# Patient Record
Sex: FEMALE | Race: WHITE | NOT HISPANIC OR LATINO | Employment: UNEMPLOYED | URBAN - METROPOLITAN AREA
[De-identification: names, ages, dates, MRNs, and addresses within clinical notes are randomized per-mention and may not be internally consistent; named-entity substitution may affect disease eponyms.]

---

## 2017-05-19 ENCOUNTER — TELEPHONE (OUTPATIENT)
Dept: FAMILY MEDICINE | Facility: CLINIC | Age: 57
End: 2017-05-19

## 2017-05-19 NOTE — TELEPHONE ENCOUNTER
Panel Management Review      Patient has the following on her problem list: None      Composite cancer screening  Chart review shows that this patient is due/due soon for the following Pap Smear, Mammogram and Colonoscopy  Summary:    Patient is due/failing the following:   COLONOSCOPY, MAMMOGRAM, PAP and PHYSICAL    Action needed:   Patient needs office visit for Physical/Pap/Mammogram/Colonoscopy.    Type of outreach:    Sent letter.    Questions for provider review:    None                                                                                                                                    Noa Estes LPN     Chart routed to Care Team .

## 2017-05-19 NOTE — LETTER
[facility]  [address]  [facility] [id number] [phone]                                                                                                           Nicole Clancy  [address]  [facility] [address]    May 19, 2017          Dear Nicole Clancy,      We care about your well-being!  In order to ensure we are providing the best quality care, we have reviewed your chart and see that you are due for:     ___x__ Physical   ___x__ Pap Smear   ___x__ Mammogram   _____ Diabetes Followup   _____ Hypertension Followup   _____ Cholesterol Followup (Provider Appointment)   _____ Cholesterol Test (Lab-Only Appointment)   ___x__ Other:  Colonoscopy     We can assist you in scheduling these appointments at [phone].    If you have had (or plan to have) any of these tests done at a facility other than [facility] or a [facility], please have the results from these tests sent to your primary physician at [facility].             Sincerely,    Nicole Siegler, PA

## 2017-10-18 ENCOUNTER — TELEPHONE (OUTPATIENT)
Dept: FAMILY MEDICINE | Facility: CLINIC | Age: 57
End: 2017-10-18

## 2017-10-18 NOTE — TELEPHONE ENCOUNTER
Panel Management Review      Patient has the following on her problem list: None      Composite cancer screening  Chart review shows that this patient is due/due soon for the following Pap Smear, Mammogram and Colonoscopy  Summary:    Patient is due/failing the following:   COLONOSCOPY, MAMMOGRAM, PAP and PHQ9    Action needed:   Patient needs office visit for Physical.    Type of outreach:    Sent letter.    Questions for provider review:    None                                                                                                                                    Noa Estes LPN     Chart routed to Care Team .

## 2017-10-18 NOTE — LETTER
October 18, 2017    Nicole Clancy  [address] [facility] [address]    Dear Jordon Rivera cares about your health and your health plan.  I have reviewed your medical conditions, medication list and lab results, and am making recommendations based on this review to better manage your health.    You are in particular need of attention regarding:  -Depression/Anxiety  -Breast Cancer Screening  -Colon Cancer Screening  -Cervical Cancer Screening  -Wellness (Physical) Visit     I am recommending that you:     -schedule a WELLNESS (Physical) APPOINTMENT with me.   I will check fasting labs the same day - nothing to eat except water and meds for 8-10 hours prior.    -schedule a MAMMOGRAM which is due.    1 in 8 women will develop invasive breast cancer during her lifetime and it is the most common non-skin cancer in American women.  EARLY detection, new treatments, and a better understanding of the disease have increased survival rates - the 5 year survival rate in the 1960s was 63% and today it is close to 90%.    If you are under/uninsured, we recommend you contact [facility]. They offer mammograms at no charge or on a sliding fee charge. You can schedule with them at [phone]. Please have them send us the results.      Please disregard this reminder if you have had this exam elsewhere within the last year.  It would be helpful for us to have a copy of your mammogram report in your file so that we can best coordinate your care - please contact us with when your test was done so we can update your record.             -schedule a COLONOSCOPY to look for colon cancer (due every 10 years or 5 years in higher risk situations.)        Colon cancer is now the second leading cause of cancer-related deaths in the [place] for both men and women and there are over 130,000 new cases and 50,000 deaths per year from colon cancer.  Colonoscopies can prevent 90-95% of these deaths.  Problem  lesions can be removed before they ever become cancer.  This test is not only looking for cancer, but also getting rid of precancerious lesions.    If you are under/uninsured, we recommend you contact the [employer] program. [employer] is a free colorectal cancer screening program that provides colonoscopies for eligible under/uninsured [place] men and women. If you are interested in receiving a free colonoscopy, please call [employer] at [phone] (mention code [employer]) to see if you re eligible.      If you do not wish to do a colonoscopy or cannot afford to do one, at this time, there is another option. It is called a FIT test or Fecal Immunochemical Occult Blood Test (take home stool sample kit).  It does not replace the colonoscopy for colorectal cancer screening, but it can detect hidden bleeding in the lower colon.  It does need to be repeated every year and if a positive result is obtained, you would be referred for a colonoscopy.          If you have completed either one of these tests at another facility, please call with the details of when and where the tests were done and if they were normal or not. Or have the records sent to our clinic so that we can best coordinate your care.    -schedule a PAP SMEAR EXAM which is due.  Please disregard this reminder if you have had this exam elsewhere within the last year.  It would be helpful for us to have a copy of your recent pap smear report in our file so that we can best coordinate your care.    If you are under/uninsured, we recommend you contact the [facility]. They offer pap smears at no charge or on a sliding fee charge. You can schedule with them at [phone]. Please have them send us the results.    Fill out PHQ-9 and send back in the envelope provided.      Please call us at the [facility] location:  [phone] or use [employer] to address the above recommendations.     Thank you for trusting [facility].  We appreciate the  opportunity to serve you and look forward to supporting your healthcare in the future.    If you have (or plan to have) any of these tests done at a facility other than a [facility] or a [facility], please have the results sent to the [facility] location noted above.      Best Regards,    Nicole Siegler, PA

## 2017-11-29 ENCOUNTER — OFFICE VISIT (OUTPATIENT)
Dept: ORTHOPEDICS | Facility: CLINIC | Age: 57
End: 2017-11-29

## 2017-11-29 VITALS
SYSTOLIC BLOOD PRESSURE: 132 MMHG | BODY MASS INDEX: 29.99 KG/M2 | DIASTOLIC BLOOD PRESSURE: 87 MMHG | HEART RATE: 87 BPM | WEIGHT: 180 LBS | HEIGHT: 65 IN

## 2017-11-29 DIAGNOSIS — S76.011A SPRAIN, GLUTEUS MEDIUS, RIGHT, INITIAL ENCOUNTER: Primary | ICD-10-CM

## 2017-11-29 DIAGNOSIS — M25.551 HIP PAIN, RIGHT: ICD-10-CM

## 2017-11-29 RX ORDER — DICLOFENAC SODIUM 75 MG/1
75 TABLET, DELAYED RELEASE ORAL 2 TIMES DAILY
Qty: 30 TABLET | Refills: 0 | Status: SHIPPED | OUTPATIENT
Start: 2017-11-29 | End: 2017-12-14

## 2017-11-29 NOTE — PROGRESS NOTES
CHIEF COMPLAINT:  Consult (Right hip pain)       HISTORY OF PRESENT ILLNESS  Ms. Kirill Clancy is a pleasant 57 year old year old female who presents to clinic today with right hip pain.  Pain began after salsa dancing with her boyfriend.  Nicole explains that she has had an Xray about two months ago at an urgent care.  Told she had a normal hip XR.  Tried ibuprofen, muscle relaxant with no improvement.    Of possible significance, patient has had 4 back surgeries since 2011.  Severe spinal stenosis.  L4-L5 fusion, L4-S1, L2-L3 fusion.      Onset: sudden, noticed after a night of salsa dancing  Location: right hip, greater troch  Quality:  sharp  Duration: 2 months  Severity: 10/10 at worst  Timing:intermittent episodes worse with walking, laying on right side  Modifying factors:  resting and non-use makes it better, movement and use makes it worse  Associated signs & symptoms: pain  Previous similar pain: No  Treatments to date:  Heating pad, Bengay, Tylenol    Additional history: as documented    MEDICAL HISTORY  There is no problem list on file for this patient.  Lumbar spinal stenosis    Current Outpatient Prescriptions   Medication Sig Dispense Refill     oxyCODONE (ROXICODONE) 10 MG IR tablet Take 1 tablet (10 mg) by mouth 3 times daily as needed for moderate to severe pain (no more than 3 pills per day) 24 tablet 0     Cholecalciferol (VITAMIN D3 PO) Take by mouth daily       calcium carbonate (OS-DANIEL 500 MG [place]. [place]) 500 MG tablet Take 500 mg by mouth 2 times daily       Escitalopram Oxalate (LEXAPRO PO) Take  by mouth.       ALPRAZolam (XANAX PO) Take  by mouth.         Allergies   Allergen Reactions     Animal Dander        Family History   Problem Relation Age of Onset     Lung Cancer Mother        Additional medical/Social/Surgical histories reviewed in [facility] and updated as appropriate.     REVIEW OF SYSTEMS (11/29/2017)  CONSTITUTIONAL: Denies fever and weight loss  EYES: Denies acute vision  "changes  ENT: Denies hearing changes or difficulty swallowing  CARDIAC: Denies chest pain or edema  RESPIRATORY: Denies dyspnea, cough or wheeze  GASTROINTESTINAL: Denies abdominal pain, nausea, vomiting  MUSCULOSKELETAL: See HPI  SKIN: Denies any recent rash or lesion  NEUROLOGICAL: Denies numbness or focal weakness  PSYCHIATRIC: No history of psychiatric symptoms or problems  ENDOCRINE: Denies current diagnosis of diabetes   HEMATOLOGY: Denies episodes of easy bleeding      PHYSICAL EXAM  /87  Pulse 87  Ht 1.638 m (5' 4.5\")  Wt 81.6 kg (180 lb)  BMI 30.42 kg/m2    General Appearance: Well appearing, alert, in no acute distress, well-hydrated, and well nourished  Cardiovascular: no signs of upper or lower extremity edema  Respiratory: no respiratory distress, no audible wheezing, no labored breathing, symmetric thoracic excursion  Psychiatric: mood and affect are appropriate, patient is oriented to time, place and person  Musculoskeletal -RT hip  - stance: normal gait without limp, no obvious leg length discrepancy, normal heel and toe walk, single leg squat displays knee valgus, contralateral hip drop, internal rotation of the hip   - inspection: no swelling or effusion,  normal bone and joint alignment, no obvious deformity  - palpation: tender over the greater trochanter, near entirety of gluteus medius muscle  - ROM: pain with active abduction, normal and painless flexion, extension, IR, ER, adduction  - strength: 5/5 in all planes  - special tests:  (-) TANG  (-) FADIR  no pain with axial femoral load  +mild trendelenberg on right.  (-)SLR  Full lumbar ROM without exacerbation of pain.  Neuro  - no sensory or motor deficit, grossly normal coordination, normal muscle tone  Skin  - no ecchymosis, erythema, warmth, or induration, no obvious rash  Psych  - interactive, appropriate, normal mood and affect     ASSESSMENT & PLAN  Ms. Kirill Clancy is a 57 year old year old female who presents to clinic " today with pain of her right lateral hip over gluteus medius/minimus distribution. At this point with minimal intervention the injury should be treated as a gluteal tendinopathy/strain with obvious core weakness.  Cannot fully r/o small gluteus tear, however.    Diagnosis: Gluteus medius strain, tendinopathy    -Start physical therapy, core program  -Voltaren BID x 7d  -Ice to hip as needed  -Follow up 6 weeks; consider U/S hip vs. MRI r/o tear.    It was a pleasure seeing Nicole today.    Barrett Abdalla DO, CAQSM  Primary Care Sports Medicine

## 2017-11-29 NOTE — LETTER
11/29/2017       RE: Nicole Clancy  [address]  [place] [facility] [address]     Dear Colleague,    Thank you for referring your patient, Nicole Clancy, to the [facility] at [facility]. Please see a copy of my visit note below.    CHIEF COMPLAINT:  Consult (Right hip pain)       HISTORY OF PRESENT ILLNESS  Ms. Kirill Clancy is a pleasant 57 year old year old female who presents to clinic today with right hip pain.  Pain began after salsa dancing with her boyfriend.  Nicole explains that she has had an Xray about two months ago at an urgent care.  Told she had a normal hip XR.  Tried ibuprofen, muscle relaxant with no improvement.    Of possible significance, patient has had 4 back surgeries since 2011.  Severe spinal stenosis.  L4-L5 fusion, L4-S1, L2-L3 fusion.      Onset: sudden, noticed after a night of salsa dancing  Location: right hip, greater troch  Quality:  sharp  Duration: 2 months  Severity: 10/10 at worst  Timing:intermittent episodes worse with walking, laying on right side  Modifying factors:  resting and non-use makes it better, movement and use makes it worse  Associated signs & symptoms: pain  Previous similar pain: No  Treatments to date:  Heating pad, Bengay, Tylenol    Additional history: as documented    MEDICAL HISTORY  There is no problem list on file for this patient.  Lumbar spinal stenosis    Current Outpatient Prescriptions   Medication Sig Dispense Refill     oxyCODONE (ROXICODONE) 10 MG IR tablet Take 1 tablet (10 mg) by mouth 3 times daily as needed for moderate to severe pain (no more than 3 pills per day) 24 tablet 0     Cholecalciferol (VITAMIN D3 PO) Take by mouth daily       calcium carbonate (OS-DANIEL 500 MG [place]. [place]) 500 MG tablet Take 500 mg by mouth 2 times daily       Escitalopram Oxalate (LEXAPRO PO) Take  by mouth.       ALPRAZolam (XANAX PO) Take  by mouth.         Allergies   Allergen Reactions      "Animal Dander        Family History   Problem Relation Age of Onset     Lung Cancer Mother        Additional medical/Social/Surgical histories reviewed in [facility] and updated as appropriate.     REVIEW OF SYSTEMS (11/29/2017)  CONSTITUTIONAL: Denies fever and weight loss  EYES: Denies acute vision changes  ENT: Denies hearing changes or difficulty swallowing  CARDIAC: Denies chest pain or edema  RESPIRATORY: Denies dyspnea, cough or wheeze  GASTROINTESTINAL: Denies abdominal pain, nausea, vomiting  MUSCULOSKELETAL: See HPI  SKIN: Denies any recent rash or lesion  NEUROLOGICAL: Denies numbness or focal weakness  PSYCHIATRIC: No history of psychiatric symptoms or problems  ENDOCRINE: Denies current diagnosis of diabetes   HEMATOLOGY: Denies episodes of easy bleeding      PHYSICAL EXAM  /87  Pulse 87  Ht 1.638 m (5' 4.5\")  Wt 81.6 kg (180 lb)  BMI 30.42 kg/m2    General Appearance: Well appearing, alert, in no acute distress, well-hydrated, and well nourished  Cardiovascular: no signs of upper or lower extremity edema  Respiratory: no respiratory distress, no audible wheezing, no labored breathing, symmetric thoracic excursion  Psychiatric: mood and affect are appropriate, patient is oriented to time, place and person  Musculoskeletal -RT hip  - stance: normal gait without limp, no obvious leg length discrepancy, normal heel and toe walk, single leg squat displays knee valgus, contralateral hip drop, internal rotation of the hip   - inspection: no swelling or effusion,  normal bone and joint alignment, no obvious deformity  - palpation: tender over the greater trochanter, near entirety of gluteus medius muscle  - ROM: pain with active abduction, normal and painless flexion, extension, IR, ER, adduction  - strength: 5/5 in all planes  - special tests:  (-) TANG  (-) FADIR  no pain with axial femoral load  +mild trendelenberg on right.  (-)SLR  Full lumbar ROM without exacerbation of pain.  Neuro  - no sensory or " motor deficit, grossly normal coordination, normal muscle tone  Skin  - no ecchymosis, erythema, warmth, or induration, no obvious rash  Psych  - interactive, appropriate, normal mood and affect     ASSESSMENT & PLAN  Ms. Kirill Clancy is a 57 year old year old female who presents to clinic today with pain of her right lateral hip over gluteus medius/minimus distribution. At this point with minimal intervention the injury should be treated as a gluteal tendinopathy/strain with obvious core weakness.  Cannot fully r/o small gluteus tear, however.    Diagnosis: Gluteus medius strain, tendinopathy    -Start physical therapy, core program  -Voltaren BID x 7d  -Ice to hip as needed  -Follow up 6 weeks; consider U/S hip vs. MRI r/o tear.    It was a pleasure seeing Nicole today.    Barrett bAdalla DO, CAM  Primary Care Sports Medicine

## 2017-11-29 NOTE — MR AVS SNAPSHOT
"              After Visit Summary   2017    Nicole Clancy    MRN: [id number]           Patient Information     Date Of Birth          1960        Visit Information        Provider Department      2017 9:20 AM Barrett Abdalla DO [facility]        Today's Diagnoses     Sprain, gluteus medius, right, initial encounter    -  1    Hip pain, right           Follow-ups after your visit        Who to contact     Please call your clinic at [phone] to:    Ask questions about your health    Make or cancel appointments    Discuss your medicines    Learn about your test results    Speak to your doctor   If you have compliments or concerns about an experience at your clinic, or if you wish to file a complaint, please contact [facility] Patient Relations at [phone] or email us at [email or web address]         Additional Information About Your Visit        MyChart Information     [facility] is an electronic gateway that provides easy, online access to your medical records. With [facility], you can request a clinic appointment, read your test results, renew a prescription or communicate with your care team.     To sign up for [facility] visit the website at [email or web address]   You will be asked to enter the access code listed below, as well as some personal information. Please follow the directions to create your username and password.     Your access code is: [email or web address]  Expires: 2018  6:31 AM     Your access code will  in 90 days. If you need help or a new code, please contact your [facility] or call [phone] for assistance.        Care EveryWhere ID     This is your Care EveryWhere ID. This could be used by other organizations to access your [place] medical records  [id number]        Your Vitals Were     Pulse Height BMI (Body Mass Index)             87 1.638 m (5' 4.5\") 30.42 kg/m2          " Blood Pressure from Last 3 Encounters:   11/29/17 132/87   12/09/16 102/70   12/08/16 120/80    Weight from Last 3 Encounters:   11/29/17 81.6 kg (180 lb)   12/09/16 87.3 kg (192 lb 8 oz)   12/08/16 85.7 kg (189 lb)              Today, you had the following     No orders found for display         Today's Medication Changes          These changes are accurate as of: 11/29/17  5:30 PM.  If you have any questions, ask your nurse or doctor.               Start taking these medicines.        Dose/Directions    diclofenac 75 MG EC tablet   Commonly known as:  VOLTAREN   Used for:  Sprain, gluteus medius, right, initial encounter        Dose:  75 mg   Take 1 tablet (75 mg) by mouth 2 times daily for 30 doses   Quantity:  30 tablet   Refills:  0         Stop taking these medicines if you haven't already. Please contact your care team if you have questions.     calcium carbonate 1250 MG tablet   Commonly known as:  OS-DANIEL 500 mg [place]. [place]           LEXAPRO PO           oxyCODONE IR 10 MG tablet   Commonly known as:  ROXICODONE           VITAMIN D3 PO                Where to get your medicines      These medications were sent to [facility] [id number] [place] [address] AT SEC OF [address] [address]     Phone:  [phone]     diclofenac 75 MG EC tablet                Primary Care Provider Fax #    Physician No Ref-Primary [phone]       No address on file        Equal Access to Services     JULIETH DOHERTY AH: Theodore Klein, waaxda lunaomy, qaybta kaalmada yahir, alicia campos. So [facility] [phone].    ATENCIÓN: Si habla español, tiene a elizabeth disposición servicios gratuitos de asistencia lingüística. Llame al [phone].    We comply with applicable federal civil rights laws and [place] laws. We do not discriminate on the basis of race, color, national origin, age, disability, sex, sexual orientation, or gender  identity.            Thank you!     Thank you for choosing [facility]  for your care. Our goal is always to provide you with excellent care. Hearing back from our patients is one way we can continue to improve our services. Please take a few minutes to complete the written survey that you may receive in the mail after your visit with us. Thank you!             Your Updated Medication List - Protect others around you: Learn how to safely use, store and throw away your medicines at www.disposemymeds.org.          This list is accurate as of: 11/29/17  5:30 PM.  Always use your most recent med list.                   Brand Name Dispense Instructions for use Diagnosis    diclofenac 75 MG EC tablet    VOLTAREN    30 tablet    Take 1 tablet (75 mg) by mouth 2 times daily for 30 doses    Sprain, gluteus medius, right, initial encounter       XANAX PO      Take  by mouth.

## 2017-12-04 DIAGNOSIS — S76.011A STRAIN OF GLUTEUS MEDIUS OF RIGHT LOWER EXTREMITY, INITIAL ENCOUNTER: Primary | ICD-10-CM

## 2018-02-16 ENCOUNTER — TELEPHONE (OUTPATIENT)
Dept: FAMILY MEDICINE | Facility: CLINIC | Age: 58
End: 2018-02-16

## 2018-02-16 NOTE — TELEPHONE ENCOUNTER
Panel Management Review      Patient has the following on her problem list:   Depression / Dysthymia review    Measure:  Needs PHQ-9 score of 4 or less during index window.  Administer PHQ-9 and if score is 5 or more, send encounter to provider for next steps.    5 - 7 month window range:     No flowsheet data found.    If PHQ-9 recheck is 5 or more, route to provider for next steps.    Patient is due for:  PHQ9 and DAP      Composite cancer screening  Chart review shows that this patient is due/due soon for the following Pap Smear, Mammogram and Colonoscopy  Summary:    Patient is due/failing the following:   COLONOSCOPY, MAMMOGRAM, PAP, PHQ9 and PHYSICAL    Action needed:   Patient needs office visit for Physical/pap/mammo/PHQ-9.    Type of outreach:    Sent letter.    Questions for provider review:    None                                                                                                                                    Noa Estes LPN     Chart routed to Care Team .

## 2019-03-15 ENCOUNTER — OFFICE VISIT (OUTPATIENT)
Dept: ORTHOPEDICS | Facility: CLINIC | Age: 59
End: 2019-03-15
Payer: COMMERCIAL

## 2019-03-15 VITALS
SYSTOLIC BLOOD PRESSURE: 122 MMHG | HEART RATE: 106 BPM | DIASTOLIC BLOOD PRESSURE: 80 MMHG | WEIGHT: 170 LBS | HEIGHT: 65 IN | OXYGEN SATURATION: 96 % | BODY MASS INDEX: 28.32 KG/M2

## 2019-03-15 DIAGNOSIS — M79.645 CHRONIC PAIN OF BOTH THUMBS: ICD-10-CM

## 2019-03-15 DIAGNOSIS — M18.11 PRIMARY OSTEOARTHRITIS OF FIRST CARPOMETACARPAL JOINT OF RIGHT HAND: ICD-10-CM

## 2019-03-15 DIAGNOSIS — M79.644 CHRONIC PAIN OF BOTH THUMBS: ICD-10-CM

## 2019-03-15 DIAGNOSIS — M65.311 TRIGGER FINGER OF RIGHT THUMB: Primary | ICD-10-CM

## 2019-03-15 DIAGNOSIS — G89.29 CHRONIC PAIN OF BOTH THUMBS: ICD-10-CM

## 2019-03-15 PROCEDURE — 20550 NJX 1 TENDON SHEATH/LIGAMENT: CPT | Mod: RT | Performed by: FAMILY MEDICINE

## 2019-03-15 PROCEDURE — 99213 OFFICE O/P EST LOW 20 MIN: CPT | Mod: 25 | Performed by: FAMILY MEDICINE

## 2019-03-15 RX ORDER — TRAMADOL HYDROCHLORIDE 50 MG/1
50 TABLET ORAL
COMMUNITY
Start: 2019-02-27

## 2019-03-15 RX ORDER — ALPRAZOLAM 1 MG
TABLET ORAL
Refills: 1 | COMMUNITY
Start: 2019-03-13

## 2019-03-15 RX ORDER — TRIAMCINOLONE ACETONIDE 40 MG/ML
40 INJECTION, SUSPENSION INTRA-ARTICULAR; INTRAMUSCULAR ONCE
Status: DISPENSED | OUTPATIENT
Start: 2019-03-15

## 2019-03-15 RX ORDER — OXYCODONE AND ACETAMINOPHEN 5; 325 MG/1; MG/1
1-2 TABLET ORAL
COMMUNITY
Start: 2019-02-22

## 2019-03-15 RX ORDER — HYDROXYZINE HYDROCHLORIDE 25 MG/1
50 TABLET, FILM COATED ORAL
COMMUNITY
Start: 2019-03-12

## 2019-03-15 RX ORDER — IBUPROFEN 800 MG/1
TABLET ORAL
Refills: 0 | COMMUNITY
Start: 2018-05-02

## 2019-03-15 RX ORDER — TRIAMCINOLONE ACETONIDE 40 MG/ML
20 INJECTION, SUSPENSION INTRA-ARTICULAR; INTRAMUSCULAR ONCE
Status: COMPLETED | OUTPATIENT
Start: 2019-03-15 | End: 2019-03-15

## 2019-03-15 RX ORDER — ACETAMINOPHEN 500 MG
500-1000 TABLET ORAL
COMMUNITY
Start: 2017-10-30

## 2019-03-15 RX ORDER — DIPHENOXYLATE HYDROCHLORIDE AND ATROPINE SULFATE 2.5; .025 MG/1; MG/1
1 TABLET ORAL
COMMUNITY

## 2019-03-15 RX ORDER — TRAMADOL HYDROCHLORIDE 50 MG/1
50 TABLET ORAL EVERY 6 HOURS PRN
Qty: 12 TABLET | Refills: 0 | Status: SHIPPED | OUTPATIENT
Start: 2019-03-15 | End: 2019-03-18

## 2019-03-15 RX ADMIN — TRIAMCINOLONE ACETONIDE 20 MG: 40 INJECTION, SUSPENSION INTRA-ARTICULAR; INTRAMUSCULAR at 09:59

## 2019-03-15 ASSESSMENT — MIFFLIN-ST. JEOR: SCORE: 1356.99

## 2019-03-15 ASSESSMENT — PAIN SCALES - GENERAL: PAINLEVEL: WORST PAIN (10)

## 2019-03-15 NOTE — LETTER
3/15/2019         RE: Nicole Roy  [address]  [address]  [facility] [address]        Dear Colleague,    Thank you for referring your patient, Nicole Roy, to the [facility]. Please see a copy of my visit note below.    CHIEF COMPLAINT:  Consult (bilateral thumb pain. no injury )       HISTORY OF PRESENT ILLNESS  Ms. Roy is a pleasant 58 year old year old female who presents to clinic today with bilateral thumb pain, right>left.  Nicole explains that these symptoms began on the right over the last 6 months or so.  Pain is in the base of the thumbs and travels distally.  Her right thumb initially began with pain and eventually symptoms of triggering with flexion and extension.  Triggering began about 3 months ago or so.  She does have times when this thumb seems to be locked in flexion and she has to forcibly open.    She notes more recent intermittent triggering of the left thumb but this is not as bad.  This is been going on for about the last month or so.  It is not locked.    No known initial trauma or inciting event.  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  Patient attempted splinting by taping the thumbs for about 6-8 weeks which improved her symptoms slightly.  No other treatments to date.    Additional history: as documented    MEDICAL HISTORY  There is no problem list on file for this patient.      Current Outpatient Medications   Medication Sig Dispense Refill     ALPRAZolam (XANAX PO) Take  by mouth.       diclofenac (VOLTAREN) 75 MG EC tablet Take 1 tablet (75 mg) by mouth 2 times daily for 30 doses 30 tablet 0       Allergies   Allergen Reactions     Animal Dander      Suture      Hypericum Perforatum Rash     Trazodone Rash       Family History   Problem Relation Age of Onset     Lung Cancer Mother        Additional medical/Social/Surgical histories reviewed in [facility] and updated as appropriate.    "  REVIEW OF SYSTEMS (3/15/2019)  CONSTITUTIONAL: Denies fever and weight loss  EYES: Denies acute vision changes  ENT: Denies hearing changes or difficulty swallowing  CARDIAC: Denies chest pain or edema  RESPIRATORY: Denies dyspnea, cough or wheeze  GASTROINTESTINAL: Denies abdominal pain, nausea, vomiting  MUSCULOSKELETAL: See HPI  SKIN: Denies any recent rash or lesion  NEUROLOGICAL: Denies numbness or focal weakness  PSYCHIATRIC: No history of psychiatric symptoms or problems  ENDOCRINE: Diagnosis of diabetes: No  HEMATOLOGY: Denies episodes of easy bleeding      PHYSICAL EXAM  Ht 1.651 m (5' 5\")   Wt 77.1 kg (170 lb)   BMI 28.29 kg/m       General  - normal appearance, in no obvious distress  CV  - normal radial pulse  Pulm  - normal respiratory pattern, non-labored  Musculoskeletal - Thumb bilaterally  - inspection: no atrophy, normal joint alignment, no swelling  - palpation: Tender to palpation at the A1 region of bilateral thumbs and into the IP joint at the volar aspect, no tenderness at the anatomical snuffbox. Mild CMC TTP on right  - ROM right:  MCP 90 deg flexion   0 deg extension    deg flexion   0 deg extension   DIP 80 deg flexion   0 deg extension   palpable snap at the A1 pulley with active flexion, reproducible on right only.  - ROM left:  MCP 90 deg flexion   0 deg extension    deg flexion   0 deg extension   DIP 80 deg flexion   0 deg extension  - strength: 5/5  strength, 5/5 wrist abduction, 5/5 flexion, extension, pronation, supination, adduction  - special tests:  (-) varus  (-) valgus  Neuro  - no numbness, no motor deficit, grossly normal coordination, normal muscle tone  Skin  - no ecchymosis, erythema, warmth, or induration, no obvious rash  Psych  - interactive, appropriate, normal mood and affect    IMAGING : X-ray right thumb  Ramesh, Rad Results In - 10/29/2018  5:24 PM CDT  COMPARISON:  None.    FINDINGS:  Mild osteoarthritis in the first CMC joint. No acute bone or " "joint abnormalities.     ASSESSMENT & PLAN  Ms. Roy is a 58 year old year old female who presents to clinic today with subacute bilateral thumb pain, right greater than left.  Clinical examination today consistent with trigger thumb of her right upper extremity and perhaps early trigger thumb of the left upper extremity.    Diagnosis: Trigger thumb of right upper extremity, left thumb pain, CMC osteoarthritis of right thumb    -Corticosteroid injection to the right A1 pulley and tendon sheath today  -Bilateral thumb splinting at night and with periods of increased triggering  -Consider hand therapy in the future, declined at this time  -May consider left thumb injection if she has appreciable relief from the right  -Voltaren gel to bilateral thumbs near tendon regions  -Small prescription for tramadol for breakthrough pain  -Follow up 4 weeks     Trigger Thumb Injection - Ultrasound Guided  The patient was informed of the risks and the benefits of the procedure and a written consent was signed.  The patient s right thumb was prepped with chlorhexidine in sterile fashion.   20 mg of triamcinolone suspension was drawn up into a 3 mL syringe with 1.5 mL of 1% lidocaine  Injection was performed using sterile technique.  Under ultrasound guidance a 1.5\" 25-gauge needle was used to enter the tendon sheath at the A1 pulley.  Needle placement was visualized and documented with ultrasound.  Ultrasound was necessary to ensure that steroid did not enter the tendon itself which could potentially cause tendon rupture.  Injection performed long axis to the probe.  Injection solution visualized within the joint space.  Images were permanently stored for the patient's record.  There were no complications. The patient tolerated the procedure well. There was negligible bleeding.   The patient was instructed to ice the hand upon leaving clinic and refrain from overuse over the next 3 days.   The patient was instructed to call or " go to the emergency room with any unusual pain, swelling, redness, or if otherwise concerned.  A follow up appointment will be scheduled to evaluate response to the injection, and to assess range of motion and pain  It was a pleasure seeing Nicole today.    Barrett Abdalla DO, [facility]  Primary Care Sports Medicine      Prior to injection, verified patient identity using patient's name and date of birth.  Due to injection administration, patient instructed to remain in clinic for 15 minutes  afterwards, and to report any adverse reaction to me immediately.    Joint injection was performed.      Drug Amount Wasted:  Yes: 20 mg/ml   Vial/Syringe: Single dose vial  Expiration Date:      Again, thank you for allowing me to participate in the care of your patient.        Sincerely,        Barrett Abdalla DO

## 2019-03-15 NOTE — PROGRESS NOTES
Prior to injection, verified patient identity using patient's name and date of birth.  Due to injection administration, patient instructed to remain in clinic for 15 minutes  afterwards, and to report any adverse reaction to me immediately.    Joint injection was performed.      Drug Amount Wasted:  Yes: 20 mg/ml   Vial/Syringe: Single dose vial  Expiration Date:

## 2019-03-15 NOTE — PROGRESS NOTES
CHIEF COMPLAINT:  Consult (bilateral thumb pain. no injury )       HISTORY OF PRESENT ILLNESS  Ms. Roy is a pleasant 58 year old year old female who presents to clinic today with bilateral thumb pain, right>left.  Nicole explains that these symptoms began on the right over the last 6 months or so.  Pain is in the base of the thumbs and travels distally.  Her right thumb initially began with pain and eventually symptoms of triggering with flexion and extension.  Triggering began about 3 months ago or so.  She does have times when this thumb seems to be locked in flexion and she has to forcibly open.    She notes more recent intermittent triggering of the left thumb but this is not as bad.  This is been going on for about the last month or so.  It is not locked.    No known initial trauma or inciting event.  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  Patient attempted splinting by taping the thumbs for about 6-8 weeks which improved her symptoms slightly.  No other treatments to date.    Additional history: as documented    MEDICAL HISTORY  There is no problem list on file for this patient.      Current Outpatient Medications   Medication Sig Dispense Refill     ALPRAZolam (XANAX PO) Take  by mouth.       diclofenac (VOLTAREN) 75 MG EC tablet Take 1 tablet (75 mg) by mouth 2 times daily for 30 doses 30 tablet 0       Allergies   Allergen Reactions     Animal Dander      Suture      Hypericum Perforatum Rash     Trazodone Rash       Family History   Problem Relation Age of Onset     Lung Cancer Mother        Additional medical/Social/Surgical histories reviewed in [facility] and updated as appropriate.     REVIEW OF SYSTEMS (3/15/2019)  CONSTITUTIONAL: Denies fever and weight loss  EYES: Denies acute vision changes  ENT: Denies hearing changes or difficulty swallowing  CARDIAC: Denies chest pain or edema  RESPIRATORY: Denies dyspnea, cough or  "wheeze  GASTROINTESTINAL: Denies abdominal pain, nausea, vomiting  MUSCULOSKELETAL: See HPI  SKIN: Denies any recent rash or lesion  NEUROLOGICAL: Denies numbness or focal weakness  PSYCHIATRIC: No history of psychiatric symptoms or problems  ENDOCRINE: Diagnosis of diabetes: No  HEMATOLOGY: Denies episodes of easy bleeding      PHYSICAL EXAM  Ht 1.651 m (5' 5\")   Wt 77.1 kg (170 lb)   BMI 28.29 kg/m      General  - normal appearance, in no obvious distress  CV  - normal radial pulse  Pulm  - normal respiratory pattern, non-labored  Musculoskeletal - Thumb bilaterally  - inspection: no atrophy, normal joint alignment, no swelling  - palpation: Tender to palpation at the A1 region of bilateral thumbs and into the IP joint at the volar aspect, no tenderness at the anatomical snuffbox. Mild CMC TTP on right  - ROM right:  MCP 90 deg flexion   0 deg extension    deg flexion   0 deg extension   DIP 80 deg flexion   0 deg extension   palpable snap at the A1 pulley with active flexion, reproducible on right only.  - ROM left:  MCP 90 deg flexion   0 deg extension    deg flexion   0 deg extension   DIP 80 deg flexion   0 deg extension  - strength: 5/5  strength, 5/5 wrist abduction, 5/5 flexion, extension, pronation, supination, adduction  - special tests:  (-) varus  (-) valgus  Neuro  - no numbness, no motor deficit, grossly normal coordination, normal muscle tone  Skin  - no ecchymosis, erythema, warmth, or induration, no obvious rash  Psych  - interactive, appropriate, normal mood and affect    IMAGING : X-ray right thumb  Ramesh, Rad Results In - 10/29/2018  5:24 PM CDT  COMPARISON:  None.    FINDINGS:  Mild osteoarthritis in the first CMC joint. No acute bone or joint abnormalities.     ASSESSMENT & PLAN  Ms. Roy is a 58 year old year old female who presents to clinic today with subacute bilateral thumb pain, right greater than left.  Clinical examination today consistent with trigger thumb of " "her right upper extremity and perhaps early trigger thumb of the left upper extremity.    Diagnosis: Trigger thumb of right upper extremity, left thumb pain, CMC osteoarthritis of right thumb    -Corticosteroid injection to the right A1 pulley and tendon sheath today  -Bilateral thumb splinting at night and with periods of increased triggering  -Consider hand therapy in the future, declined at this time  -May consider left thumb injection if she has appreciable relief from the right  -Voltaren gel to bilateral thumbs near tendon regions  -Small prescription for tramadol for breakthrough pain  -Follow up 4 weeks     Trigger Thumb Injection - Ultrasound Guided  The patient was informed of the risks and the benefits of the procedure and a written consent was signed.  The patient s right thumb was prepped with chlorhexidine in sterile fashion.   20 mg of triamcinolone suspension was drawn up into a 3 mL syringe with 1.5 mL of 1% lidocaine  Injection was performed using sterile technique.  Under ultrasound guidance a 1.5\" 25-gauge needle was used to enter the tendon sheath at the A1 pulley.  Needle placement was visualized and documented with ultrasound.  Ultrasound was necessary to ensure that steroid did not enter the tendon itself which could potentially cause tendon rupture.  Injection performed long axis to the probe.  Injection solution visualized within the joint space.  Images were permanently stored for the patient's record.  There were no complications. The patient tolerated the procedure well. There was negligible bleeding.   The patient was instructed to ice the hand upon leaving clinic and refrain from overuse over the next 3 days.   The patient was instructed to call or go to the emergency room with any unusual pain, swelling, redness, or if otherwise concerned.  A follow up appointment will be scheduled to evaluate response to the injection, and to assess range of motion and pain  It was a pleasure seeing " Nicole today.    Barrett Abdalla DO, CAQSM  Primary Care Sports Medicine

## 2019-03-15 NOTE — NURSING NOTE
"Nicole Roy's chief complaint for this visit includes:  Chief Complaint   Patient presents with     Consult     bilateral thumb pain. no injury      PCP: No Ref-Primary, Physician    Referring Provider:  Referred Self, MD  No address on file    /80 (BP Location: Left arm, Patient Position: Sitting, Cuff Size: Adult Regular)   Pulse 106   Ht 1.651 m (5' 5\")   Wt 77.1 kg (170 lb)   SpO2 96%   BMI 28.29 kg/m    Worst Pain (10)     Do you need any medication refills at today's visit? No    Prior to injection, verified patient identity using patient's name and date of birth.  Due to injection administration, patient instructed to remain in clinic for 15 minutes  afterwards, and to report any adverse reaction to me immediately.    Joint injection was performed.      Drug Amount Wasted:  None.  Vial/Syringe: Single dose vial  Expiration Date:  09/2020    "

## 2019-03-15 NOTE — PATIENT INSTRUCTIONS
Thanks for coming today.  Ortho/Sports Medicine Clinic  [address] [place] [address]    To schedule future appointments in Ortho Clinic, you may call [phone].    To schedule ordered imaging by your provider:   Call Central [facility] [phone]    To schedule an injection ordered by your provider:  Call [facility] Injection scheduling line: [phone]  [facility] available online at:  [email or web address]    Please call if any further questions or concerns ([phone]).  Clinic hours 8 am to 5 pm.    Return to clinic (call) if symptoms worsen or fail to improve.

## 2021-06-02 ENCOUNTER — RECORDS - HEALTHEAST (OUTPATIENT)
Dept: ADMINISTRATIVE | Facility: CLINIC | Age: 61
End: 2021-06-02

## 2021-06-03 ENCOUNTER — RECORDS - HEALTHEAST (OUTPATIENT)
Dept: ADMINISTRATIVE | Facility: CLINIC | Age: 61
End: 2021-06-03

## 2022-08-28 NOTE — LETTER
February 16, 2018    Nicole Clancy  [address]  [facility] [address]    Dear Jordon Rivera cares about your health and your health plan.  I have reviewed your medical conditions, medication list and lab results, and am making recommendations based on this review to better manage your health.    You are in particular need of attention regarding:  -Depression/Anxiety  -Breast Cancer Screening  -Colon Cancer Screening  -Cervical Cancer Screening  -Wellness (Physical) Visit     I am recommending that you:     -schedule a WELLNESS (Physical) APPOINTMENT with me.   I will check fasting labs the same day - nothing to eat except water and meds for 8-10 hours prior. (If you go elsewhere for Wellness visits then please disregard this reminder.)    -schedule a MAMMOGRAM which is due.    1 in 8 women will develop invasive breast cancer during her lifetime and it is the most common non-skin cancer in [place] women.  EARLY detection, new treatments, and a better understanding of the disease have increased survival rates - the 5 year survival rate in the 1960s was 63% and today it is close to 90%.    If you are under/uninsured, we recommend you contact the [facility]. They offer mammograms at no charge or on a sliding fee charge. You can schedule with them at [phone]. Please have them send us the results.      Please disregard this reminder if you have had this exam elsewhere within the last year.  It would be helpful for us to have a copy of your mammogram report in your file so that we can best coordinate your care - please contact us with when your test was done so we can update your record.             -schedule a COLONOSCOPY to look for colon cancer (due every 10 years or 5 years in higher risk situations.)        Colon cancer is now the second leading cause of cancer-related deaths in the [place] for both men and women and there are over 130,000 new cases and 50,000 deaths per year  from colon cancer.  Colonoscopies can prevent 90-95% of these deaths.  Problem lesions can be removed before they ever become cancer.  This test is not only looking for cancer, but also getting rid of precancerious lesions.    If you are under/uninsured, we recommend you contact the [employer] program. [employer] is a free colorectal cancer screening program that provides colonoscopies for eligible under/uninsured [place] men and women. If you are interested in receiving a free colonoscopy, please call [employer] at [phone] (mention code [employer]) to see if you re eligible.      If you do not wish to do a colonoscopy or cannot afford to do one, at this time, there is another option. It is called a FIT test or Fecal Immunochemical Occult Blood Test (take home stool sample kit).  It does not replace the colonoscopy for colorectal cancer screening, but it can detect hidden bleeding in the lower colon.  It does need to be repeated every year and if a positive result is obtained, you would be referred for a colonoscopy.          If you have completed either one of these tests at another facility, please call with the details of when and where the tests were done and if they were normal or not. Or have the records sent to our clinic so that we can best coordinate your care.    -schedule a PAP SMEAR EXAM which is due.  Please disregard this reminder if you have had this exam elsewhere within the last year.  It would be helpful for us to have a copy of your recent pap smear report in our file so that we can best coordinate your care.    If you are under/uninsured, we recommend you contact the [facility]. They offer pap smears at no charge or on a sliding fee charge. You can schedule with them at [phone]. Please have them send us the results.      Please call us at the [place] location:  [phone] or use [employer] to address the above recommendations.     Thank you for trusting [facility].  We  appreciate the opportunity to serve you and look forward to supporting your healthcare in the future.    If you have (or plan to have) any of these tests done at a facility other than a [facility] or a [facility], please have the results sent to the [facility] location noted above.      Best Regards,    Kayleigh Mclaughlin, DO     Nicolle, Sekou Mota, Jaida Bagley Nicolle, Sekou Mota, Brian Lockett, Jaida Quintero, Michelle Louis, Lacy Mccloud, Rolf